# Patient Record
Sex: MALE | Race: WHITE
[De-identification: names, ages, dates, MRNs, and addresses within clinical notes are randomized per-mention and may not be internally consistent; named-entity substitution may affect disease eponyms.]

---

## 2021-11-24 ENCOUNTER — HOSPITAL ENCOUNTER (OUTPATIENT)
Dept: HOSPITAL 35 - OR | Age: 36
Setting detail: OBSERVATION
LOS: 1 days | Discharge: HOME | End: 2021-11-25
Attending: ORTHOPAEDIC SURGERY | Admitting: ORTHOPAEDIC SURGERY
Payer: COMMERCIAL

## 2021-11-24 VITALS — SYSTOLIC BLOOD PRESSURE: 120 MMHG | DIASTOLIC BLOOD PRESSURE: 79 MMHG

## 2021-11-24 VITALS — HEIGHT: 65 IN | BODY MASS INDEX: 34.99 KG/M2 | WEIGHT: 210 LBS

## 2021-11-24 VITALS — DIASTOLIC BLOOD PRESSURE: 83 MMHG | SYSTOLIC BLOOD PRESSURE: 144 MMHG

## 2021-11-24 VITALS — DIASTOLIC BLOOD PRESSURE: 74 MMHG | SYSTOLIC BLOOD PRESSURE: 112 MMHG

## 2021-11-24 DIAGNOSIS — Z79.899: ICD-10-CM

## 2021-11-24 DIAGNOSIS — G40.909: ICD-10-CM

## 2021-11-24 DIAGNOSIS — Z20.822: ICD-10-CM

## 2021-11-24 DIAGNOSIS — M51.17: Primary | ICD-10-CM

## 2021-11-24 DIAGNOSIS — F90.9: ICD-10-CM

## 2021-11-24 PROCEDURE — 51412: CPT

## 2021-11-24 PROCEDURE — 70005: CPT

## 2021-11-24 PROCEDURE — 56525: CPT

## 2021-11-24 PROCEDURE — 50010 RENAL EXPLORATION: CPT

## 2021-11-24 PROCEDURE — 50402: CPT

## 2021-11-24 PROCEDURE — 10102: CPT

## 2021-11-24 PROCEDURE — 50101: CPT

## 2021-11-24 PROCEDURE — 50704: CPT

## 2021-11-24 PROCEDURE — 62110: CPT

## 2021-11-24 PROCEDURE — 50850: CPT

## 2021-11-24 PROCEDURE — 62900: CPT

## 2021-11-24 NOTE — O
[facility]
Macie Ruiz
[place]   [address]                     OPERATIVE REPORT              
_______________________________________________________________________________
 
Name:       PRIETO ALICIA            Room #:         150-1       [facility] 
M.R.#:      [id number]                       Account #:      [id number]  
Admission:  11/24/21    Attend Phys:    Dante Hill MD   
Discharge:              Date of Birth:  [birth year]  
                                                          Report #: [id number]
                                                                    [id number] 
_______________________________________________________________________________
THIS REPORT FOR:  
 
cc:  Yohan Jackson MD, Martin J. MD Clymer, David J. MD                                           ~
 
DATE OF SERVICE: 11/24/2021
 
PREOPERATIVE DIAGNOSIS:  Recurrent lumbar disk herniation, L5-S1, left, with 
radiculopathy.
 
POSTOPERATIVE DIAGNOSIS:  Recurrent lumbar disk herniation, L5-S1, left, with 
radiculopathy.
 
PROCEDURE:  Revision decompressive laminectomy, diskectomy, and foraminotomy 
L5-S1, left.
 
SURGEON:  Dante Hill MD
 
INDICATIONS:  This 36-year-old gentleman underwent lumbar laminectomy and 
diskectomy a number of months ago at the L5-S1 level.  Initially, he did quite 
well, but recently he has had some recurrence of radiating left leg pain.  New 
MRI study suggests some recurrence of disk herniation, as well as foraminal 
stenosis related to bony spurring.  Given these findings, we have elected to 
return for a revision more aggressive decompression.  We have discussed 
preoperatively that he may have some ongoing symptoms as a result of scarring 
and may in the future have further disk problems and may even require 
stabilization if further degenerative changes should occur.  He understands and 
prefers to go ahead with a revision decompressive laminectomy and diskectomy at 
this time.
 
DESCRIPTION OF PROCEDURE:  The patient was taken to the operating room where he 
was placed under general anesthesia.  Prophylactic intravenous antibiotics were 
administered.  He was turned to the prone position.  The low back was 
meticulously prepped and draped.  A skin incision was made through the old 
surgical scar overlying the L5-S1 interspace, extending slightly toward the left
midline.  The incision was extended slightly longer to allow better 
visualization, as he is somewhat heavy, with a good deal of adipose tissue.  The
fascia was incised and the paraspinal muscles were retracted out toward the left
side, exposing the lamina. The old laminectomy defect at L5 and S1 was 
identified and there was a good deal of scarring, making dissection difficult.  
The scar was elevated and somewhat more aggressive laminectomy in the inferior 
aspect of L5 and the superior aspect of S1 was created.  An intraoperative x-ray
was obtained with a probe in place, confirming that I was at the appropriate 
L5-S1 level.  The dissection was then extended more aggressively laterally, 
undercutting the facet and extending to a somewhat more aggressive foraminotomy.
 
 
 
[address]   [address]                     OPERATIVE REPORT              
_______________________________________________________________________________
 
Name:       PRIETO ALICIA            Room #:         150-1       [facility] 
M.R.#:      [id number]                       Account #:      [id number]  
Admission:  11/24/21    Attend Phys:    Dante Hill MD   
Discharge:              Date of Birth:  [birth year]  
                                                          Report #: [id number]
                                                                    [id number] 
_______________________________________________________________________________
 
The nerve root was significantly involved in scar tissue and gentle traction and
debridement was performed until the nerve root was freed up. The disk was found 
to be bulging, but there was no extruded disk fragment.  The disk was easily 
entered and a moderate amount of additional degenerative disk debris was 
removed.  This was extended out laterally where the disk seemed to be more 
prominent and this area was decompressed nicely.  An intraoperative x-ray was 
obtained with the probe in the disk space, confirming that I was at the 
appropriate L5-S1 level.  The dissection was continued until there were no 
further loose degenerative disk debris.  The nerve root was thoroughly 
inspected.  It was somewhat erythematous and slightly swollen, consistent with 
some chronic compression.  It was traced nicely all the way out through the 
neural foramina and seemed to be freed up well.  There was no apparent further 
compression or deformity of the nerve root.  The neural foramina seemed to be 
widely open.  The lateral gutter was opened.  The disk was gently palpated 
across the midline and no further significant disk bulging was identified.  The 
canal was inspected both proximally and distally.  No other abnormalities were 
identified.  At this point, the wound was copiously irrigated.  Good hemostasis 
was established with the use of thrombin and gentle sponge pressure for several 
minutes.  Once the wound was clearly dry, 40 mg of Depo-Medrol were left in the 
epidural space and the laminotomy defect was covered with a small sheet of 
Gelfoam soaked in thrombin.  The muscle layer and fascia were closed with 
multiple #1 Vicryl sutures.  The abundant adipose tissues were also closed with 
#1 Vicryl.  The more superficial tissues were closed with 2-0 Monocryl.  The 
skin was closed with skin staples.  A sterile dressing was applied.  The patient
was awakened and returned to recovery room in good condition.
 
 
 
 
 
 
 
 
 
 
 
 
 
 
 
 
 
 
  <ELECTRONICALLY SIGNED>
   By: Dante Hill MD           
  11/24/21     1227
D: 11/24/21 1111                           _____________________________________
T: 11/24/21 1122                           Dante Hill MD             /nt

## 2021-11-24 NOTE — NUR
"CORNELL OSMAN A/O X 4, 2 L 02 VIA NASAL CANNULA, [place] BILATERAL HERAING AIDS,
GLASSES, RIGHT HAND IV-DRESSING DRY, CLEAN, INTACT. CLEAR LLIQUID DIET, GAUZE
ON BACK, BACK PAIN 8/10 MORPHINE GIVEN. WIFE AT BEDSIDE.

## 2021-11-25 VITALS — DIASTOLIC BLOOD PRESSURE: 79 MMHG | SYSTOLIC BLOOD PRESSURE: 120 MMHG

## 2021-11-25 VITALS — SYSTOLIC BLOOD PRESSURE: 145 MMHG | DIASTOLIC BLOOD PRESSURE: 89 MMHG

## 2021-11-25 NOTE — NUR
Assumed care of pt at 0700. Pt a&ox4. Pain controlled. Pt ambulating SBA.
Voiding well. Tolerating diet. Dressing c/d/i. Family at bedside. Pt will
discharge to home.

## 2021-11-25 NOTE — NUR
PT IS A/O X4 AND IS UP WITH ASSISTANCE. WEANED PT OFF OXYGEN. CURRENTLY ON
ROOM AIR. VSS. AFEBRILE. ADVANCED DIET TO REGULAR AND TOLERATING WELL. VOIDS
PER URINAL. NO BM THIS SHIFT BUT DOES REPORT PASSING FLATTUS SINCE PROCEDURE.
DRSG TO BACK IS C/D/I TO LOWER BACK. PT DOES C/O PAIN. PRN PAIN MEDICATION
GIVEN AS DIRECTED. MAINTANCE FLUID INFUSING AT PRESCRIBED RATE. PT IS
PROGRESSING TOWARD PLAN OF CARE DC GOALS. CALLS OUT APPROPRIATELY. CALL LIGHT
IS WITHIN REACH.